# Patient Record
Sex: MALE | Race: WHITE | URBAN - METROPOLITAN AREA
[De-identification: names, ages, dates, MRNs, and addresses within clinical notes are randomized per-mention and may not be internally consistent; named-entity substitution may affect disease eponyms.]

---

## 2018-10-04 ENCOUNTER — HISTORICAL (OUTPATIENT)
Dept: ADMINISTRATIVE | Facility: HOSPITAL | Age: 53
End: 2018-10-04

## 2018-10-04 LAB
ABS NEUT (OLG): 7.86 X10(3)/MCL (ref 2.1–9.2)
ALBUMIN SERPL-MCNC: 3.6 GM/DL (ref 3.4–5)
ALBUMIN/GLOB SERPL: 1 RATIO (ref 1–2)
ALP SERPL-CCNC: 122 UNIT/L (ref 45–117)
ALT SERPL-CCNC: 16 UNIT/L (ref 12–78)
AST SERPL-CCNC: 14 UNIT/L (ref 15–37)
BASOPHILS # BLD AUTO: 0.08 X10(3)/MCL
BASOPHILS NFR BLD AUTO: 1 %
BILIRUB SERPL-MCNC: 0.4 MG/DL (ref 0.2–1)
BILIRUBIN DIRECT+TOT PNL SERPL-MCNC: 0.1 MG/DL
BILIRUBIN DIRECT+TOT PNL SERPL-MCNC: 0.3 MG/DL
BUN SERPL-MCNC: 9 MG/DL (ref 7–18)
CALCIUM SERPL-MCNC: 8.9 MG/DL (ref 8.5–10.1)
CHLORIDE SERPL-SCNC: 101 MMOL/L (ref 98–107)
CO2 SERPL-SCNC: 30 MMOL/L (ref 21–32)
CREAT SERPL-MCNC: 0.9 MG/DL (ref 0.6–1.3)
EOSINOPHIL # BLD AUTO: 0.3 10*3/UL
EOSINOPHIL NFR BLD AUTO: 2 %
ERYTHROCYTE [DISTWIDTH] IN BLOOD BY AUTOMATED COUNT: 13.5 % (ref 11.5–14.5)
GLOBULIN SER-MCNC: 5.2 GM/ML (ref 2.3–3.5)
GLUCOSE SERPL-MCNC: 86 MG/DL (ref 74–106)
HCT VFR BLD AUTO: 48.7 % (ref 40–51)
HGB BLD-MCNC: 16.2 GM/DL (ref 13.5–17.5)
IMM GRANULOCYTES # BLD AUTO: 0.04 10*3/UL
IMM GRANULOCYTES NFR BLD AUTO: 0 %
LYMPHOCYTES # BLD AUTO: 2.91 X10(3)/MCL
LYMPHOCYTES NFR BLD AUTO: 25 % (ref 13–40)
MCH RBC QN AUTO: 31 PG (ref 26–34)
MCHC RBC AUTO-ENTMCNC: 33.3 GM/DL (ref 31–37)
MCV RBC AUTO: 93.1 FL (ref 80–100)
MONOCYTES # BLD AUTO: 0.64 X10(3)/MCL
MONOCYTES NFR BLD AUTO: 5 % (ref 4–12)
NEUTROPHILS # BLD AUTO: 7.86 X10(3)/MCL
NEUTROPHILS NFR BLD AUTO: 66 X10(3)/MCL
PLATELET # BLD AUTO: 226 X10(3)/MCL (ref 130–400)
PMV BLD AUTO: 10.5 FL (ref 7.4–10.4)
POTASSIUM SERPL-SCNC: 4.8 MMOL/L (ref 3.5–5.1)
PROT SERPL-MCNC: 8.8 GM/DL (ref 6.4–8.2)
RBC # BLD AUTO: 5.23 X10(6)/MCL (ref 4.5–5.9)
SODIUM SERPL-SCNC: 135 MMOL/L (ref 136–145)
WBC # SPEC AUTO: 11.8 X10(3)/MCL (ref 4.5–11)

## 2022-04-10 ENCOUNTER — HISTORICAL (OUTPATIENT)
Dept: ADMINISTRATIVE | Facility: HOSPITAL | Age: 57
End: 2022-04-10

## 2022-04-26 VITALS
OXYGEN SATURATION: 97 % | DIASTOLIC BLOOD PRESSURE: 86 MMHG | SYSTOLIC BLOOD PRESSURE: 128 MMHG | BODY MASS INDEX: 25.15 KG/M2 | HEIGHT: 67 IN | WEIGHT: 160.25 LBS

## 2022-09-30 NOTE — HISTORICAL [facility] CERNER
This is a historical note converted from Christina. Formatting and pictures may have been removed.  Please reference Christina for original formatting and attached multimedia. Chief Complaint  New Patient  History of Present Illness  52-year-old white male presents to establish care in the internal medicine clinic.? He has a past medical history of?chronic low back pain secondary to an injury?over 20 years ago,?chronic?left shoulder pain times 1 year,?tobacco use.? He is complaining of?left shoulder pain which has been ongoing for the past year but denies history of trauma, weakness, paresthesias. ?Also complaining of chronic low back pain which has been present?for many years now?with occasional?flareups of sciatica.? Denies saddle anesthesia, bowel or bladder incontinence, difficulty ambulating.? Tells me that he has?a toenail fungus?bilaterally,?as well.? Currently smoking?a pack of cigarettes a day, not ready to quit.  Review of Systems  ????Constitutional: No fever, No chills, No weakness, No fatigue.  ?????Eye: No recent visual problem.  ?????Respiratory: No shortness of breath, No cough, No sputum production, No wheezing.  ?????Cardiovascular: No chest pain, No palpitations, No peripheral edema.  ?????Gastrointestinal: No nausea, No vomiting, No diarrhea, No constipation, No heartburn, No abdominal pain.  ?????Genitourinary: No dysuria.  ?????Endocrine: No excessive thirst, No polyuria, No cold intolerance, No heat intolerance.  ?????Musculoskeletal:?Low back pain, left shoulder pain, No decreased range of motion.  ?????Integumentary: No rash, No pruritus.? Toenail fungus.  ?????Neurologic: Alert and oriented X4, No numbness, No tingling, No headache.  ?????Psychiatric: No anxiety, No depression.  Physical Exam  Vitals & Measurements  T:?36.7? ?C (Oral)? HR:?72(Peripheral)? RR:?16? BP:?128/86?  HT:?171?cm? HT:?171?cm? WT:?72.7?kg? WT:?72.7?kg? BMI:?24.86?  General: Alert and oriented, No acute distress.  ?????Eye:  Pupils are equal, round and reactive to light, Extraocular movements are intact, Normal conjunctiva.  ?????HENT: Normocephalic, Oral mucosa is moist, No pharyngeal erythema.? Poor dentition.  ?????Neck: Supple, Non-tender.  ?????Respiratory: Lungs are clear to auscultation, Respirations are non-labored.  ?????Cardiovascular: Normal rate, Regular rhythm, No murmur, Good pulses equal in all extremities, No edema.  ?????Gastrointestinal: Soft, Non-tender, Non-distended, Normal bowel sounds.  ?????Musculoskeletal: Left shoulder reveals no tenderness palpation,?normal range of motion, Normal strength.? No tenderness palpation of?thoracic?or lumbar spine, or paraspinal muscles; normal range of motion, normal strength,?negative straight leg test bilaterally.  ?????Integumentary: Warm, Dry.? Bilateral toenails are thickened and yellowed.  ?????Neurologic: Alert, Oriented.  ?????Cognition and Speech: Oriented, Speech clear and coherent.  ?????Psychiatric: Cooperative, Appropriate mood & affect.  Assessment/Plan  Flu vaccine need  Ordered:  Influenza Virus Vaccine, Inactivated, 0.5 mL, form: Susp, IM, Once-Unscheduled, first dose 10/04/18 8:25:00 CDT  ?  Orders:  CBC w/ Auto Diff, Routine collect, *Est. 01/04/19 3:00:00 CST, Blood, Order for future visit, *Est. Stop date 01/04/19 3:00:00 CST, Lab Collect, Wellness examination  Onychomycosis  Screening cholesterol level  Tobacco use, 10/04/18 8:35:00 CDT  CBC w/ Auto Diff, Routine collect, *Est. 10/04/18 3:00:00 CDT, Blood, Order for future visit, *Est. Stop date 10/04/18 3:00:00 CDT, Lab Collect, Wellness examination  Onychomycosis, 10/04/18 8:35:00 CDT  Clinic Follow up, *Est. 01/11/19 3:00:00 CST, Order for future visit, Chronic left shoulder pain  Wellness examination  Onychomycosis  Tobacco use  Screening cholesterol level  Chronic low back pain, [facility]  Comprehensive Metabolic Panel, Routine collect, *Est. 01/04/19 3:00:00 CST, Blood, Order for future  visit, *Est. Stop date 01/04/19 3:00:00 CST, Lab Collect, Wellness examination  Onychomycosis  Screening cholesterol level  Tobacco use, 10/04/18 8:35:00 CDT  Comprehensive Metabolic Panel, Routine collect, *Est. 10/04/18 3:00:00 CDT, Blood, Order for future visit, *Est. Stop date 10/04/18 3:00:00 CDT, Lab Collect, Wellness examination  Onychomycosis, 10/04/18 8:35:00 CDT  Lipid Panel, Routine collect, *Est. 01/04/19 3:00:00 CST, Blood, Order for future visit, *Est. Stop date 01/04/19 3:00:00 CST, Lab Collect, Wellness examination  Onychomycosis  Screening cholesterol level  Tobacco use, 10/04/18 8:35:00 CDT  XR Shoulder Left Minimum 2 Views, Routine, *Est. 10/04/18 3:00:00 CDT, Pain, None, Ambulatory, Rad Type, Order for future visit, Chronic left shoulder pain, Not Scheduled, *Est. 10/04/18 3:00:00 CDT  XR Spine Lumbar 2 or 3 Views, Routine, *Est. 10/04/18 3:00:00 CDT, Back Pain, None, Ambulatory, Rad Type, Order for future visit, Chronic low back pain, Not Scheduled, *Est. 10/04/18 3:00:00 CDT  ?  1.? Wellness, screening cholesterol:?Fasting labs one week prior to follow-up appointment in 3 months.  2.? Tobacco use: Advised on cessation, not ready to quit.  3.? Bilateral onychomycosis: Advised on good hygiene,?CBC with differential and CMP today before prescribing Diflucan?1 tablet weekly times 6 months.? Advised patient he is not to drink alcohol while taking this medication.  4.? Chronic?left shoulder pain: Advised on over-the-counter analgesics as needed only, x-ray today.  5.? Chronic low back pain: Advised on over-the-counter analgesics only, x-ray today.  ?  Screening: Colon cancer screening:?Will discuss during follow-up appointment.  ?   Patient voiced understanding.   Problem List/Past Medical History  Ongoing  Tobacco user  Historical  No qualifying data  Procedure/Surgical History  Extirpation of Matter from Left Cornea, External Approach (05/19/2018)  Removal of foreign body, external eye;  corneal, with slit lamp (2018)   Medications  No active medications  Allergies  No Known Allergies  Social History  Alcohol  Current, 1-2 times per month, 2018  Employment/School  Work/School description: not working. Operates hazardous equipment: No., 10/04/2018  Exercise  Self assessment: Fair condition., 10/04/2018  Home/Environment  Lives with Alone. Living situation: Home/Independent. Alcohol abuse in household: No. Substance abuse in household: No. Smoker in household: Yes. Injuries/Abuse/Neglect in household: No. Feels unsafe at home: No. Family/Friends available for support: Yes. Concern for family members at home: No. Major illness in household: No. Financial concerns: No. TV/Computer concerns: No., 10/04/2018  Nutrition/Health  Regular, 10/04/2018  Substance Abuse  Never, 2018  Tobacco  Current every day smoker, Cigarettes, Yes, 10/04/2018  Family History  Mother: ; melanoma.  Father: Healthy.  4 Brothers: Healthy.  2 Sisters: Healthy  Health Maintenance  Health Maintenance  ???Pending?(in the next year)  ??? ??Due?  ??? ? ? ?ADL Screening due??10/04/18??and every 1??year(s)  ??? ? ? ?Alcohol Misuse Screening due??10/04/18??and every 1??year(s)  ??? ? ? ?Aspirin Therapy for CVD Prevention due??10/04/18??and every 1??year(s)  ??? ? ? ?Colorectal Screening due??10/04/18??and every?  ??? ? ? ?Diabetes Screening due??10/04/18??and every?  ??? ? ? ?Influenza Vaccine due??10/04/18??and every?  ??? ? ? ?Lipid Screening due??10/04/18??and every?  ??? ? ? ?Tetanus Vaccine due??10/04/18??and every 10??year(s)  ??? ??Due In Future?  ??? ? ? ?Blood Pressure Screening not due until??19??and every 1??year(s)  ??? ? ? ?Body Mass Index Check not due until??19??and every 1??year(s)  ??? ? ? ?Depression Screening not due until??19??and every 1??year(s)  ??? ? ? ?Obesity Screening not due until??19??and every 1??year(s)  ??? ? ? ?Smoking Cessation not due until??19??and  every 1??year(s)  ???Satisfied?(in the past 1 year)  ??? ??Satisfied?  ??? ? ? ?Blood Pressure Screening on??07/16/18.??Satisfied by Tan LPN, Flori B  ??? ? ? ?Body Mass Index Check on??07/16/18.??Satisfied by Tan LPN, Flori B  ??? ? ? ?Depression Screening on??07/16/18.??Satisfied by Tan LPN, Flori B  ??? ? ? ?Obesity Screening on??07/16/18.??Satisfied by Tan LPN, Flori B  ??? ? ? ?Smoking Cessation on??07/16/18.??Satisfied by Tan LPN, Flori B  ?  ?      no